# Patient Record
Sex: FEMALE | Race: WHITE | Employment: FULL TIME | URBAN - METROPOLITAN AREA
[De-identification: names, ages, dates, MRNs, and addresses within clinical notes are randomized per-mention and may not be internally consistent; named-entity substitution may affect disease eponyms.]

---

## 2019-06-26 ENCOUNTER — APPOINTMENT (OUTPATIENT)
Dept: CT IMAGING | Age: 24
DRG: 347 | End: 2019-06-26
Payer: MEDICAID

## 2019-06-26 ENCOUNTER — APPOINTMENT (OUTPATIENT)
Dept: GENERAL RADIOLOGY | Age: 24
DRG: 347 | End: 2019-06-26
Payer: MEDICAID

## 2019-06-26 ENCOUNTER — HOSPITAL ENCOUNTER (INPATIENT)
Age: 24
LOS: 1 days | Discharge: HOME | DRG: 347 | End: 2019-06-27
Attending: EMERGENCY MEDICINE | Admitting: SURGERY
Payer: MEDICAID

## 2019-06-26 DIAGNOSIS — S22.080A COMPRESSION FRACTURE OF T11 VERTEBRA (HCC): Primary | ICD-10-CM

## 2019-06-26 DIAGNOSIS — S22.080A COMPRESSION FRACTURE OF T12 VERTEBRA (HCC): ICD-10-CM

## 2019-06-26 DIAGNOSIS — S22.000A THORACIC COMPRESSION FRACTURE, CLOSED, INITIAL ENCOUNTER (HCC): ICD-10-CM

## 2019-06-26 LAB
ALLEN TEST: ABNORMAL
ANION GAP SERPL CALCULATED.3IONS-SCNC: 11 MMOL/L (ref 9–17)
BLOOD BANK SPECIMEN: ABNORMAL
BUN BLDV-MCNC: 13 MG/DL (ref 6–20)
CARBOXYHEMOGLOBIN: ABNORMAL %
CHLORIDE BLD-SCNC: 107 MMOL/L (ref 98–107)
CHP ED QC CHECK: YES
CO2: 23 MMOL/L (ref 20–31)
CREAT SERPL-MCNC: 0.57 MG/DL (ref 0.5–0.9)
ETHANOL PERCENT: <0.01 %
ETHANOL: <10 MG/DL
FIO2: ABNORMAL
GFR AFRICAN AMERICAN: >60 ML/MIN
GFR NON-AFRICAN AMERICAN: >60 ML/MIN
GFR SERPL CREATININE-BSD FRML MDRD: ABNORMAL ML/MIN/{1.73_M2}
GFR SERPL CREATININE-BSD FRML MDRD: ABNORMAL ML/MIN/{1.73_M2}
GLUCOSE BLD-MCNC: 120 MG/DL (ref 70–99)
HCG QUALITATIVE: NEGATIVE
HCO3 VENOUS: ABNORMAL MMOL/L (ref 24–30)
HCT VFR BLD CALC: 41.2 % (ref 36.3–47.1)
HEMOGLOBIN: 13.5 G/DL (ref 11.9–15.1)
INR BLD: 1
MCH RBC QN AUTO: 30 PG (ref 25.2–33.5)
MCHC RBC AUTO-ENTMCNC: 32.8 G/DL (ref 28.4–34.8)
MCV RBC AUTO: 91.6 FL (ref 82.6–102.9)
METHEMOGLOBIN: ABNORMAL %
MODE: ABNORMAL
NEGATIVE BASE EXCESS, VEN: ABNORMAL MMOL/L (ref 0–2)
NOTIFICATION TIME: ABNORMAL
NOTIFICATION: ABNORMAL
NRBC AUTOMATED: 0 PER 100 WBC
O2 DEVICE/FLOW/%: ABNORMAL
O2 SAT, VEN: ABNORMAL %
OXYHEMOGLOBIN: ABNORMAL % (ref 95–98)
PARTIAL THROMBOPLASTIN TIME: 22.8 SEC (ref 20.5–30.5)
PATIENT TEMP: ABNORMAL
PCO2, VEN, TEMP ADJ: ABNORMAL MMHG (ref 39–55)
PCO2, VEN: ABNORMAL (ref 39–55)
PDW BLD-RTO: 11.9 % (ref 11.8–14.4)
PEEP/CPAP: ABNORMAL
PH VENOUS: ABNORMAL (ref 7.32–7.42)
PH, VEN, TEMP ADJ: ABNORMAL (ref 7.32–7.42)
PLATELET # BLD: 255 K/UL (ref 138–453)
PMV BLD AUTO: 9.6 FL (ref 8.1–13.5)
PO2, VEN, TEMP ADJ: ABNORMAL MMHG (ref 30–50)
PO2, VEN: ABNORMAL (ref 30–50)
POSITIVE BASE EXCESS, VEN: ABNORMAL MMOL/L (ref 0–2)
POTASSIUM SERPL-SCNC: 3.3 MMOL/L (ref 3.7–5.3)
PREGNANCY TEST URINE, POC: NEGATIVE
PROTHROMBIN TIME: 11 SEC (ref 9–12)
PSV: ABNORMAL
PT. POSITION: ABNORMAL
RBC # BLD: 4.5 M/UL (ref 3.95–5.11)
RESPIRATORY RATE: ABNORMAL
SAMPLE SITE: ABNORMAL
SET RATE: ABNORMAL
SODIUM BLD-SCNC: 141 MMOL/L (ref 135–144)
TEXT FOR RESPIRATORY: ABNORMAL
TOTAL HB: ABNORMAL G/DL (ref 12–16)
TOTAL RATE: ABNORMAL
VT: ABNORMAL
WBC # BLD: 16.8 K/UL (ref 3.5–11.3)

## 2019-06-26 PROCEDURE — 85027 COMPLETE CBC AUTOMATED: CPT

## 2019-06-26 PROCEDURE — 84520 ASSAY OF UREA NITROGEN: CPT

## 2019-06-26 PROCEDURE — 82947 ASSAY GLUCOSE BLOOD QUANT: CPT

## 2019-06-26 PROCEDURE — G0378 HOSPITAL OBSERVATION PER HR: HCPCS

## 2019-06-26 PROCEDURE — G0480 DRUG TEST DEF 1-7 CLASSES: HCPCS

## 2019-06-26 PROCEDURE — 72128 CT CHEST SPINE W/O DYE: CPT

## 2019-06-26 PROCEDURE — 72131 CT LUMBAR SPINE W/O DYE: CPT

## 2019-06-26 PROCEDURE — 80051 ELECTROLYTE PANEL: CPT

## 2019-06-26 PROCEDURE — 85610 PROTHROMBIN TIME: CPT

## 2019-06-26 PROCEDURE — 82805 BLOOD GASES W/O2 SATURATION: CPT

## 2019-06-26 PROCEDURE — 6370000000 HC RX 637 (ALT 250 FOR IP): Performed by: PHYSICIAN ASSISTANT

## 2019-06-26 PROCEDURE — 81025 URINE PREGNANCY TEST: CPT

## 2019-06-26 PROCEDURE — 82565 ASSAY OF CREATININE: CPT

## 2019-06-26 PROCEDURE — 6360000004 HC RX CONTRAST MEDICATION: Performed by: PHYSICIAN ASSISTANT

## 2019-06-26 PROCEDURE — 74177 CT ABD & PELVIS W/CONTRAST: CPT

## 2019-06-26 PROCEDURE — 85730 THROMBOPLASTIN TIME PARTIAL: CPT

## 2019-06-26 PROCEDURE — 84703 CHORIONIC GONADOTROPIN ASSAY: CPT

## 2019-06-26 PROCEDURE — 99285 EMERGENCY DEPT VISIT HI MDM: CPT

## 2019-06-26 PROCEDURE — 72100 X-RAY EXAM L-S SPINE 2/3 VWS: CPT

## 2019-06-26 RX ORDER — HYDROCODONE BITARTRATE AND ACETAMINOPHEN 5; 325 MG/1; MG/1
1 TABLET ORAL ONCE
Status: COMPLETED | OUTPATIENT
Start: 2019-06-26 | End: 2019-06-26

## 2019-06-26 RX ADMIN — HYDROCODONE BITARTRATE AND ACETAMINOPHEN 1 TABLET: 5; 325 TABLET ORAL at 21:31

## 2019-06-26 RX ADMIN — IOHEXOL 75 ML: 350 INJECTION, SOLUTION INTRAVENOUS at 23:31

## 2019-06-26 ASSESSMENT — PAIN DESCRIPTION - DESCRIPTORS: DESCRIPTORS: ACHING

## 2019-06-26 ASSESSMENT — PAIN DESCRIPTION - ORIENTATION: ORIENTATION: LOWER

## 2019-06-26 ASSESSMENT — PAIN DESCRIPTION - LOCATION: LOCATION: BACK

## 2019-06-26 ASSESSMENT — PAIN SCALES - GENERAL
PAINLEVEL_OUTOF10: 10
PAINLEVEL_OUTOF10: 10

## 2019-06-27 ENCOUNTER — APPOINTMENT (OUTPATIENT)
Dept: GENERAL RADIOLOGY | Age: 24
DRG: 347 | End: 2019-06-27
Payer: MEDICAID

## 2019-06-27 ENCOUNTER — APPOINTMENT (OUTPATIENT)
Dept: MRI IMAGING | Age: 24
DRG: 347 | End: 2019-06-27
Payer: MEDICAID

## 2019-06-27 VITALS
DIASTOLIC BLOOD PRESSURE: 68 MMHG | OXYGEN SATURATION: 98 % | SYSTOLIC BLOOD PRESSURE: 102 MMHG | BODY MASS INDEX: 25.51 KG/M2 | HEART RATE: 73 BPM | HEIGHT: 59 IN | TEMPERATURE: 98.9 F | RESPIRATION RATE: 23 BRPM | WEIGHT: 126.54 LBS

## 2019-06-27 PROBLEM — S22.000A THORACIC COMPRESSION FRACTURE, CLOSED, INITIAL ENCOUNTER (HCC): Status: ACTIVE | Noted: 2019-06-27

## 2019-06-27 LAB
AMPHETAMINE SCREEN URINE: NEGATIVE
BARBITURATE SCREEN URINE: NEGATIVE
BENZODIAZEPINE SCREEN, URINE: NEGATIVE
BUPRENORPHINE URINE: ABNORMAL
CANNABINOID SCREEN URINE: POSITIVE
COCAINE METABOLITE, URINE: NEGATIVE
HCG, PREGNANCY URINE (POC): NEGATIVE
MDMA URINE: ABNORMAL
METHADONE SCREEN, URINE: NEGATIVE
METHAMPHETAMINE, URINE: ABNORMAL
OPIATES, URINE: POSITIVE
OXYCODONE SCREEN URINE: NEGATIVE
PHENCYCLIDINE, URINE: NEGATIVE
PROPOXYPHENE, URINE: ABNORMAL
TEST INFORMATION: ABNORMAL
TRICYCLIC ANTIDEPRESSANTS, UR: ABNORMAL

## 2019-06-27 PROCEDURE — 6370000000 HC RX 637 (ALT 250 FOR IP): Performed by: STUDENT IN AN ORGANIZED HEALTH CARE EDUCATION/TRAINING PROGRAM

## 2019-06-27 PROCEDURE — 80307 DRUG TEST PRSMV CHEM ANLYZR: CPT

## 2019-06-27 PROCEDURE — 96374 THER/PROPH/DIAG INJ IV PUSH: CPT

## 2019-06-27 PROCEDURE — 72146 MRI CHEST SPINE W/O DYE: CPT

## 2019-06-27 PROCEDURE — 6360000002 HC RX W HCPCS: Performed by: PHYSICIAN ASSISTANT

## 2019-06-27 PROCEDURE — G0378 HOSPITAL OBSERVATION PER HR: HCPCS

## 2019-06-27 PROCEDURE — 96375 TX/PRO/DX INJ NEW DRUG ADDON: CPT

## 2019-06-27 PROCEDURE — 73562 X-RAY EXAM OF KNEE 3: CPT

## 2019-06-27 PROCEDURE — 2580000003 HC RX 258: Performed by: STUDENT IN AN ORGANIZED HEALTH CARE EDUCATION/TRAINING PROGRAM

## 2019-06-27 PROCEDURE — 2500000003 HC RX 250 WO HCPCS: Performed by: STUDENT IN AN ORGANIZED HEALTH CARE EDUCATION/TRAINING PROGRAM

## 2019-06-27 PROCEDURE — 51798 US URINE CAPACITY MEASURE: CPT

## 2019-06-27 PROCEDURE — 51701 INSERT BLADDER CATHETER: CPT

## 2019-06-27 PROCEDURE — 2060000000 HC ICU INTERMEDIATE R&B

## 2019-06-27 RX ORDER — ONDANSETRON 2 MG/ML
4 INJECTION INTRAMUSCULAR; INTRAVENOUS ONCE
Status: COMPLETED | OUTPATIENT
Start: 2019-06-27 | End: 2019-06-27

## 2019-06-27 RX ORDER — GABAPENTIN 100 MG/1
100 CAPSULE ORAL EVERY 8 HOURS
Qty: 21 CAPSULE | Refills: 0 | Status: SHIPPED | OUTPATIENT
Start: 2019-06-27 | End: 2019-07-04

## 2019-06-27 RX ORDER — ACETAMINOPHEN 500 MG
1000 TABLET ORAL EVERY 8 HOURS SCHEDULED
Status: DISCONTINUED | OUTPATIENT
Start: 2019-06-27 | End: 2019-06-27 | Stop reason: HOSPADM

## 2019-06-27 RX ORDER — CYCLOBENZAPRINE HCL 5 MG
5 TABLET ORAL EVERY 8 HOURS
Qty: 21 TABLET | Refills: 0 | Status: SHIPPED | OUTPATIENT
Start: 2019-06-27 | End: 2019-07-04

## 2019-06-27 RX ORDER — LEVOTHYROXINE SODIUM 0.07 MG/1
75 TABLET ORAL DAILY
COMMUNITY

## 2019-06-27 RX ORDER — GABAPENTIN 100 MG/1
100 CAPSULE ORAL EVERY 8 HOURS
Status: DISCONTINUED | OUTPATIENT
Start: 2019-06-27 | End: 2019-06-27 | Stop reason: HOSPADM

## 2019-06-27 RX ORDER — SODIUM CHLORIDE, SODIUM LACTATE, POTASSIUM CHLORIDE, CALCIUM CHLORIDE 600; 310; 30; 20 MG/100ML; MG/100ML; MG/100ML; MG/100ML
INJECTION, SOLUTION INTRAVENOUS CONTINUOUS
Status: DISCONTINUED | OUTPATIENT
Start: 2019-06-27 | End: 2019-06-27

## 2019-06-27 RX ORDER — CYCLOBENZAPRINE HCL 5 MG
5 TABLET ORAL EVERY 8 HOURS
Status: DISCONTINUED | OUTPATIENT
Start: 2019-06-27 | End: 2019-06-27 | Stop reason: HOSPADM

## 2019-06-27 RX ORDER — ONDANSETRON 2 MG/ML
4 INJECTION INTRAMUSCULAR; INTRAVENOUS EVERY 6 HOURS PRN
Status: DISCONTINUED | OUTPATIENT
Start: 2019-06-27 | End: 2019-06-27 | Stop reason: HOSPADM

## 2019-06-27 RX ORDER — SODIUM CHLORIDE 0.9 % (FLUSH) 0.9 %
10 SYRINGE (ML) INJECTION EVERY 12 HOURS SCHEDULED
Status: DISCONTINUED | OUTPATIENT
Start: 2019-06-27 | End: 2019-06-27 | Stop reason: HOSPADM

## 2019-06-27 RX ORDER — LIDOCAINE 4 G/G
2 PATCH TOPICAL DAILY
Status: DISCONTINUED | OUTPATIENT
Start: 2019-06-27 | End: 2019-06-27 | Stop reason: HOSPADM

## 2019-06-27 RX ORDER — DEXTROSE, SODIUM CHLORIDE, AND POTASSIUM CHLORIDE 5; .45; .15 G/100ML; G/100ML; G/100ML
INJECTION INTRAVENOUS CONTINUOUS
Status: DISCONTINUED | OUTPATIENT
Start: 2019-06-27 | End: 2019-06-27

## 2019-06-27 RX ORDER — OXYCODONE HYDROCHLORIDE 5 MG/1
5 TABLET ORAL EVERY 6 HOURS PRN
Qty: 28 TABLET | Refills: 0 | Status: SHIPPED | OUTPATIENT
Start: 2019-06-27 | End: 2019-07-04

## 2019-06-27 RX ORDER — SODIUM CHLORIDE 0.9 % (FLUSH) 0.9 %
10 SYRINGE (ML) INJECTION PRN
Status: DISCONTINUED | OUTPATIENT
Start: 2019-06-27 | End: 2019-06-27 | Stop reason: HOSPADM

## 2019-06-27 RX ORDER — OXYCODONE HYDROCHLORIDE 5 MG/1
5 TABLET ORAL EVERY 4 HOURS PRN
Status: DISCONTINUED | OUTPATIENT
Start: 2019-06-27 | End: 2019-06-27 | Stop reason: HOSPADM

## 2019-06-27 RX ADMIN — SODIUM CHLORIDE, POTASSIUM CHLORIDE, SODIUM LACTATE AND CALCIUM CHLORIDE: 600; 310; 30; 20 INJECTION, SOLUTION INTRAVENOUS at 02:02

## 2019-06-27 RX ADMIN — OXYCODONE HYDROCHLORIDE 5 MG: 5 TABLET ORAL at 10:28

## 2019-06-27 RX ADMIN — OXYCODONE HYDROCHLORIDE 5 MG: 5 TABLET ORAL at 14:22

## 2019-06-27 RX ADMIN — CYCLOBENZAPRINE HYDROCHLORIDE 5 MG: 5 TABLET, FILM COATED ORAL at 08:15

## 2019-06-27 RX ADMIN — GABAPENTIN 100 MG: 100 CAPSULE ORAL at 08:15

## 2019-06-27 RX ADMIN — HYDROMORPHONE HYDROCHLORIDE 0.25 MG: 1 INJECTION, SOLUTION INTRAMUSCULAR; INTRAVENOUS; SUBCUTANEOUS at 01:03

## 2019-06-27 RX ADMIN — POTASSIUM CHLORIDE, DEXTROSE MONOHYDRATE AND SODIUM CHLORIDE: 150; 5; 450 INJECTION, SOLUTION INTRAVENOUS at 08:13

## 2019-06-27 RX ADMIN — ONDANSETRON 4 MG: 2 INJECTION INTRAMUSCULAR; INTRAVENOUS at 01:03

## 2019-06-27 RX ADMIN — OXYCODONE HYDROCHLORIDE 5 MG: 5 TABLET ORAL at 05:10

## 2019-06-27 RX ADMIN — ACETAMINOPHEN 1000 MG: 500 TABLET ORAL at 14:21

## 2019-06-27 ASSESSMENT — ENCOUNTER SYMPTOMS
ABDOMINAL PAIN: 0
VOMITING: 0
EYE ITCHING: 0
COUGH: 0
EYE PAIN: 0
NAUSEA: 0
WHEEZING: 0
RHINORRHEA: 0
BACK PAIN: 1
EYE DISCHARGE: 0
COLOR CHANGE: 0
SORE THROAT: 0

## 2019-06-27 ASSESSMENT — PAIN SCALES - GENERAL
PAINLEVEL_OUTOF10: 5
PAINLEVEL_OUTOF10: 7
PAINLEVEL_OUTOF10: 7
PAINLEVEL_OUTOF10: 9
PAINLEVEL_OUTOF10: 8
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
## 2019-06-27 NOTE — ED NOTES
Bed: 12  Expected date:   Expected time:   Means of arrival:   Comments:     Adrienne Solorio RN  06/26/19 2041
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
## 2019-06-27 NOTE — ED NOTES
Yonathan Benitez MD at bedside at this time  Pt removed from c-collar and from [address] [place] [address], RN  06/26/19 2016

## 2019-06-27 NOTE — PROGRESS NOTES
Pt arrived to unit at approx 0145 alert and oriented from ED. C/O moderate pain to lower back. Pt is to lay supine d/t T 11-12 compression fx. Currently NPO. Family in at bedside. No complaints voiced.
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
## 2019-06-27 NOTE — PROGRESS NOTES
Occupational Therapy    Occupational Therapy Not Seen Note    DATE: 2019  Name: Chris Jackson  : 1995  MRN: [id number]    Patient not available for Occupational Therapy due to:    Pt with bedrest orders and TLS precautions in place; pt awaiting TLSO at this time.        Next Scheduled Treatment: Will check back PM if able or 2019    Electronically signed by Wally Winston OT on 2019 at 10:44 AM

## 2019-06-27 NOTE — ED NOTES
Pt moved to room 12 to be with child   Pt informed that she needs a urine sample collected      Pilar Da Silva RN  06/26/19 2042

## 2019-06-27 NOTE — ED NOTES
Bed: 01  Expected date:   Expected time:   Means of arrival:   Comments:     Evan Jeffrey RN  06/26/19 2009

## 2019-06-27 NOTE — CARE COORDINATION
[facility] [facility]  Written by: Vasquez Posadas RN    Patient Name: Ish Wong  Attending Provider: Ole Novak DO  Admit Date: 2019  8:09 PM  MRN: [id number]  Account: [de-identified]                     : 1995  Discharge Date: 2019      Disposition: home with SO independently.     Vasquez Posadas RN
Met with pt again to inform that pediatrics called and pt's daughter is being discharged today. Pt upset that she is being discharged before her. Pt asked if daughter can stay in the room with her tonight. Explained to pt that, no that is not an option as she is not able to care for her on strict bedrest.  Also explained that that is not what is best for her daughter. Pt's father walked in and stated that he just visited with her daughter on peds and she was doing well. Pt agreed to her daughter discharging to her father. Called the peds unit and spoke to Yanelis/DEVIN and explained this to her. Read Clause also spoke to pt to verify this plan.
hopeless?: No    Mood Pre-Screening (PHQ-9)         I have interviewed Stanford [phone] regarding  Her alcohol consumption/drug use and risk for excessive use. Screenings were negative. Patient  N/A intervention at this time.      Deferred []    Completed on: 6/27/2019   MADISYN Lane

## 2019-06-27 NOTE — PROGRESS NOTES
Physical Therapy  DATE: 2019    NAME: Eugenia Martinez  MRN: [id number]   : 1995    Patient not seen this date for Physical Therapy due to:  [] Blood transfusion in progress  [] Hemodialysis  []  Patient Declined  [] Spine Precautions   [x] Strict Bedrest - per RN, pt on bedrest. Pt waiting for TLSO brace. Ck back in PM if able or on . [] Surgery/ Procedure  [] Testing      [] Other        [] PT being discontinued at this time. Patient independent. No further needs. [] PT being discontinued at this time as the patient has been transferred to palliative care. No further needs.     Ambrose Garay, SPT

## 2019-06-27 NOTE — PROGRESS NOTES
PROGRESS NOTE      PATIENT NAME: Cherelle Salas  MEDICAL RECORD NO. [id number]  DATE: 2019  SURGEON: Armida Soriano  PRIMARY CARE PHYSICIAN: Jose Lainez,     HD: # 0    ASSESSMENT    Patient Active Problem List   Diagnosis    Thoracic compression fracture, closed, initial encounter [facility])       [facility]    1. Neuro checks; NS following for T11/t12 compression fx's and TLSO brace ordered, HOB 30 degrees ; multimodal pain control  2. Telemetry  3. Room air, IS  4. Start diet, d/c IVF  5. Bladder scan and straight cath prn > 400 cc  6. PT/OT  7. Bowel regimen  8. Dispo planning, likely discharge today if ambulating, pain controlled and if NS gives ok for discharge      SUBJECTIVE    Cherelle Salas has slightly improved since yesterday. Having difficulty urinating. Has remained bedrest overnight. No neuro deficits. VSS.  Pain is controlled      OBJECTIVE  VITALS: Temp: Temp: 98.3 °F (36.8 °C)Temp  Av.3 °F (36.8 °C)  Min: 98 °F (36.7 °C)  Max: 98.5 °F (15.5 °C) BP Systolic (12AVL), CJH:445 , Min:94 , GIANFRANCO:356   Diastolic (80EOG), RUK:94, Min:57, Max:74   Pulse Pulse  Av.6  Min: 62  Max: 101 Resp Resp  Av  Min: 13  Max: 19 Pulse ox SpO2  Av %  Min: 98 %  Max: 98 %    CONSTITUTIONAL: awake, alert, cooperative, no apparent distress  HEAD: atraumatic, normocephalic  EYES: sclera clear, pupils equal and reactive to light  ENT: ears are symmetric, nares patent   HEENT: moist mucous membranes  NECK: Supple, symmetrical, trachea midline  LUNGS: no respiratory distress, no audible wheezing  CARDIOVASCULAR: +S1/S2  ABDOMEN: soft, nontender, nondistended, no guarding, no rebound tenderness   MUSCULOSKELETAL: full range of motion noted; no ttp BUE, no ttp LLE, ttp R knee; no deformities; TLS precautions  NEUROLOGIC: Awake, alert, oriented to name, place and time  EXTREMITIES: peripheral pulses normal, no pedal edema, no calf tenderness  SKIN: normal coloration and turgor    No intake/output data recorded. Drain/tube output: In: -   Out: 250 [Urine:250]    LAB:  CBC:   Recent Labs     06/26/19 2242   WBC 16.8*   HGB 13.5   HCT 41.2   MCV 91.6        BMP:   Recent Labs     06/26/19  2242      K 3.3*      CO2 23   BUN 13   CREATININE 0.57   GLUCOSE 120*     COAGS:   Recent Labs     06/26/19 2242   APTT 22.8   INR 1.0       RADIOLOGY:  MRI C/T spine - T 11 and T12 anterior superior endplate compression fx's. Jose Peña  6/27/19, 10:44 AM               Trauma Attending Phi Duarte      I have reviewed the above [facility] note(s) and confirmed the key elements of the medical history and physical exam. I have seen and examined the pt. I have discussed the findings, established the care plan and recommendations with Resident, [facility] RN, bedside nurse.   Bladder scan with straight cath greater than ~350  Follow up xray knee  NS to eval today    Bud Braxton DO  6/27/2019  3:55 PM
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
## 2019-06-27 NOTE — PROGRESS NOTES
Smoking Cessation - topics covered   []  Health Risks  []  Benefits of Quitting   []  Smoking Cessation  [x]  Patient has no history of tobacco use  []  Patient is former smoker. [x]  No need for tobacco cessation education. []  Booklet given  []  Patient verbalizes understanding. []  Patient denies need for tobacco cessation education. []  Unable to meet with patient today. Will follow up as able.   Rosa Salamanca  9:40 AM

## 2019-07-29 NOTE — DISCHARGE SUMMARY
taking these medications    levothyroxine 75 MCG tablet  Commonly known as:  SYNTHROID        ASK your doctor about these medications    cyclobenzaprine 5 MG tablet  Commonly known as:  FLEXERIL  Take 1 tablet by mouth every 8 hours for 7 days  Ask about: Should I take this medication?     oxyCODONE 5 MG immediate release tablet  Commonly known as:  ROXICODONE  Take 1 tablet by mouth every 6 hours as needed for Pain for up to 7 days. Ask about: Should I take this medication? Where to Get Your Medications      You can get these medications from any pharmacy    Bring a paper prescription for each of these medications  · cyclobenzaprine 5 MG tablet  · gabapentin 100 MG capsule  · oxyCODONE 5 MG immediate release tablet       Diet: No diet orders on file diet as tolerated  Activity: no lifting more than 10-20 lbs for next two weeks  Wound Care: Daily and as needed  Follow-up:   1. With neurosurgery in 1 week  2. Follow up in  the next few weeks with PCP: Clarence Moise DO, Pama Martin  7/29/2019, 3:19 PM               Trauma Attending Jessica Devries      I have reviewed the above GCS note(s) and confirmed the key elements of the medical history and physical exam. I have seen and examined the pt. I have discussed the findings, established the care plan and recommendations with Resident, GCS RN, bedside nurse.         Nell Haynes DO  7/30/2019  2:52 PM

## 2025-02-08 ENCOUNTER — HOSPITAL ENCOUNTER (EMERGENCY)
Age: 30
Discharge: HOME | End: 2025-02-08
Payer: COMMERCIAL

## 2025-02-08 VITALS
DIASTOLIC BLOOD PRESSURE: 71 MMHG | SYSTOLIC BLOOD PRESSURE: 106 MMHG | TEMPERATURE: 98.06 F | OXYGEN SATURATION: 98 % | HEART RATE: 78 BPM

## 2025-02-08 VITALS — BODY MASS INDEX: 26.3 KG/M2

## 2025-02-08 DIAGNOSIS — J40: Primary | ICD-10-CM

## 2025-02-08 PROCEDURE — 99283 EMERGENCY DEPT VISIT LOW MDM: CPT

## 2025-02-08 PROCEDURE — 71045 X-RAY EXAM CHEST 1 VIEW: CPT

## 2025-02-08 NOTE — XR_ITS
[address] [address] 
     [phone] 
  
  
Patient Name: 
JO JOHNSON 
  
MRN: [id number]    YOB: 1995    Sex: F 
Assigned Patient Location: ER 
Current Patient Location: ED.MAIN 
Accession/Order Number: [id number] 
Exam Date: 2/08/2025  09:28    Report Date: 2/08/2025  10:03 
  
At the request of: 
NEENA SORENSEN   
  
Procedure:  XR chest 1V 
  
EXAM: XR chest 1V  
  
HISTORY: cough 
  
COMPARISON: None 
  
FINDINGS/IMPRESSION:  
  
1. Lungs are clear 
2. No pneumothorax. No pleural effusion.  
3. Heart size and mediastinal contours are normal 
4. No acute osseous abnormality 
  
  
Electronically authenticated by: BALAJI ISLAS   Date: 2/08/2025  10:03

## 2025-02-08 NOTE — ED.GENADUL1
HPI
HPI - General Adult
General
Chief complaint: Upper Respiratory Infection
Stated complaint: NAUSEA AND VOMIT
Time Seen by Provider: 02/08/25 08:57
Source: patient
Mode of arrival: walk-in
History of Present Illness
HPI narrative: 
The patient is coming to the ER with almost 7 days history even not more of a coughing , the patient mentioned that she is not a smoker although she does smoke marijuana not very often the patient admitted that she needs a work excuse because her 
son is sick right now with influenza

The patient also denies nausea vomiting or any other concerns.
Related Data
Previous Rx's

?Medication ?Instructions ?Recorded
guaifenesin 600 mg tablet, 600 mg PO BID PRN cough #14 tabs 02/08/25
extended release 12 hr (Mucinex)  
prednisone 20 mg tablet 40 mg (2 x 20 mg) PO DAILY 5 days 02/08/25
 #10 tabs 


Allergies

Allergy/AdvReac Type Severity Reaction Status Date / Time
No Known Drug Allergies Allergy   Verified 02/08/25 08:56



Opioid HPI
Opioid Management
Most Recent Opioid Data: 
      No Data to Display


Review of Systems
ROS  
 Status of ROS 10 or more systems reviewed and unremarkable except as noted in history and below   

PFSH
PFSH
Social History
Little interest or pleasure in doing things:  not at all 
Feeling down, depressed, or hopeless:  not at all 



Exam
Narrative
Exam Narrative: 
Nurses notes and vital signs reviewed and patient is not hypoxic.

General:  Well-appearing and in no apparent distress.  
Skin:  Warm, dry, no pallor noted.  
No rash.
Head:  Normocephalic, atraumatic.
Neck:  Supple, non-tender.
Eye:  Pupils are equal, round and EOMI. No scleral icterus.
Ears, Nose, Mouth, and Throat:  TM are clear, no nasal mucosal hypertrophy.  Oral mucosa is moist, no posterior oropharynx erythema, uvula is mid-line
Cardiovascular:  Regular Rate and Rhythm without murmur, gallop or rub.
Respiratory:  No accessory muscle use or respiratory distress.
Lungs are clear to auscultation, no wheezing, rales or rhonchi
Chest Wall:  no tenderness
Back: No midline thoracic or lumbar vertebral tenderness. No CVA tenderness
Musculoskeletal:  normal ROM, no calf or popliteal tenderness, no lower extremity edema/swelling
GI:  Abdomen is soft, non-distended.  Normal bowel sounds.
No masses appreciated.
No tenderness to palpation. No rebound, guarding, or rigidity noted.
Neurological:  A&O x4.  No cranial nerve dysfunction observed.  No truncal ataxia. Moves all extremities. Sensation intact.
Psychiatric:  Cooperative and interactive. Normal mood and affect.
Constitutional
Vital Signs, click to edit/add: 

Last Vital Signs

Temp  98.1 F   02/08/25 08:53
Pulse  78   02/08/25 08:53
Resp  20   02/08/25 08:53
BP  106/71   02/08/25 08:53
Pulse Ox  98   02/08/25 08:53




Course
Vital Signs
Vital signs: 

Vital Signs

Temperature  98.1 F   02/08/25 08:53
Pulse Rate  78   02/08/25 08:53
Respiratory Rate  20   02/08/25 08:53
Blood Pressure  106/71   02/08/25 08:53
Pulse Oximetry  98   02/08/25 08:53



Temperature  98.1 F   02/08/25 08:53
Pulse Rate  78   02/08/25 08:53
Respiratory Rate  20   02/08/25 08:53
Blood Pressure  106/71   02/08/25 08:53
Pulse Oximetry  98   02/08/25 08:53




Medical Decision Making
[facility] Narrative
Medical decision making narrative: 
The patient chest x-ray showed no acute pathology

Presented right now with bronchitis she was discharged home with prednisone and Mucinex supportive care with her history of smoking marijuana

The patient is to follow up with primary care physician in next 2-3 days or to return to the emergency department should any of the signs or symptoms worsen or new symptoms develop. The patient agrees with the following Diagnosis and Treatment plan 
and the patient will be discharged home.

Discharge Plan
Discharge
Chief Complaint: Upper Respiratory Infection

Clinical Impression:
 Bronchitis


Patient Disposition: Home, Self-Care

Time of Disposition Decision: 09:56

Condition: Good

Prescriptions / Home Meds:
New
  prednisone 20 mg tablet 
   40 mg PO DAILY 5 Days Qty: 10 0RF
  guaifenesin [Mucinex] 600 mg tablet extended release 12hr 
   600 mg PO BID PRN (Reason: cough) Qty: 14 0RF

Print Language: English

Instructions:  Acute Bronchitis (ED)

Referrals:
Physician,Non-Staff, MD [Primary Care Provider] - 1 week

Discharge Date/Time: 02/08/25 10:11

## 2025-02-08 NOTE — ED_ITS
HPI    
HPI - General Adult    
General    
Chief complaint: Upper Respiratory Infection    
Stated complaint: NAUSEA AND VOMIT    
Time Seen by Provider: 02/08/25 08:57    
Source: patient    
Mode of arrival: walk-in    
History of Present Illness    
HPI narrative:     
The patient is coming to the ER with almost 7 days history even not more of a   
coughing , the patient mentioned that she is not a smoker although she does   
smoke marijuana not very often the patient admitted that she needs a work excuse  
because her son is sick right now with influenza    
    
The patient also denies nausea vomiting or any other concerns.    
Related Data    
                                  Previous Rx's    
    
    
    
?Medication ?Instructions ?Recorded    
     
guaifenesin 600 mg tablet, 600 mg PO BID PRN cough #14 tabs 02/08/25    
    
extended release 12 hr (Mucinex)      
     
prednisone 20 mg tablet 40 mg (2 x 20 mg) PO DAILY 5 days 02/08/25    
    
 #10 tabs     
    
    
    
                                    Allergies    
    
    
    
Allergy/AdvReac Type Severity Reaction Status Date / Time    
     
No Known Drug Allergies Allergy   Verified 02/08/25 08:56    
    
    
    
    
Opioid HPI    
Opioid Management    
Most Recent Opioid Data:     
    
    
                                        No Data to Display    
    
    
    
Review of Systems    
    
    
ROS      
    
 Status of ROS                          10 or more systems reviewed and unremark    
able except as noted in     
history and below       
    
    
PFSH    
PFSH    
Social History    
Little interest or pleasure in doing things:  not at all     
Feeling down, depressed, or hopeless:  not at all     
    
    
    
Exam    
Narrative    
Exam Narrative:     
Nurses notes and vital signs reviewed and patient is not hypoxic.    
    
General:  Well-appearing and in no apparent distress.      
Skin:  Warm, dry, no pallor noted.      
No rash.    
Head:  Normocephalic, atraumatic.    
Neck:  Supple, non-tender.    
Eye:  Pupils are equal, round and EOMI. No scleral icterus.    
Ears, Nose, Mouth, and Throat:  TM are clear, no nasal mucosal hypertrophy.    
Oral mucosa is moist, no posterior oropharynx erythema, uvula is mid-line    
Cardiovascular:  Regular Rate and Rhythm without murmur, gallop or rub.    
Respiratory:  No accessory muscle use or respiratory distress.    
Lungs are clear to auscultation, no wheezing, rales or rhonchi    
Chest Wall:  no tenderness    
Back: No midline thoracic or lumbar vertebral tenderness. No CVA tenderness    
Musculoskeletal:  normal ROM, no calf or popliteal tenderness, no lower   
extremity edema/swelling    
GI:  Abdomen is soft, non-distended.  Normal bowel sounds.    
No masses appreciated.    
No tenderness to palpation. No rebound, guarding, or rigidity noted.    
Neurological:  A&O x4.  No cranial nerve dysfunction observed.  No truncal   
ataxia. Moves all extremities. Sensation intact.    
Psychiatric:  Cooperative and interactive. Normal mood and affect.    
Constitutional    
Vital Signs, click to edit/add:     
    
                                Last Vital Signs    
    
    
    
Temp  98.1 F   02/08/25 08:53    
     
Pulse  78   02/08/25 08:53    
     
Resp  20   02/08/25 08:53    
     
BP  106/71   02/08/25 08:53    
     
Pulse Ox  98   02/08/25 08:53    
    
    
    
    
    
Course    
Vital Signs    
Vital signs:     
    
                                   Vital Signs    
    
    
    
Temperature  98.1 F   02/08/25 08:53    
     
Pulse Rate  78   02/08/25 08:53    
     
Respiratory Rate  20   02/08/25 08:53    
     
Blood Pressure  106/71   02/08/25 08:53    
     
Pulse Oximetry  98   02/08/25 08:53    
    
    
                                            
    
    
    
Temperature  98.1 F   02/08/25 08:53    
     
Pulse Rate  78   02/08/25 08:53    
     
Respiratory Rate  20   02/08/25 08:53    
     
Blood Pressure  106/71   02/08/25 08:53    
     
Pulse Oximetry  98   02/08/25 08:53    
    
    
    
    
    
Medical Decision Making    
MDM Narrative    
Medical decision making narrative:     
The patient chest x-ray showed no acute pathology    
    
Presented right now with bronchitis she was discharged home with prednisone and   
Mucinex supportive care with her history of smoking marijuana    
    
The patient is to follow up with primary care physician in next 2-3 days or to   
return to the emergency department should any of the signs or symptoms worsen or  
new symptoms develop. The patient agrees with the following Diagnosis and   
Treatment plan and the patient will be discharged home.    
    
Discharge Plan    
Discharge    
Chief Complaint: Upper Respiratory Infection    
    
Clinical Impression:    
 Bronchitis    
    
    
Patient Disposition: Home, Self-Care    
    
Time of Disposition Decision: 09:56    
    
Condition: Good    
    
Prescriptions / Home Meds:    
New    
  prednisone 20 mg tablet     
   40 mg PO DAILY 5 Days Qty: 10 0RF    
  guaifenesin [Mucinex] 600 mg tablet extended release 12hr     
   600 mg PO BID PRN (Reason: cough) Qty: 14 0RF    
    
Print Language: English    
    
Instructions:  Acute Bronchitis (ED)    
    
Referrals:    
Physician,Non-Staff, MD [Primary Care Provider] - 1 week    
    
Discharge Date/Time: 02/08/25 10:11